# Patient Record
Sex: MALE | Race: WHITE | Employment: OTHER | URBAN - NONMETROPOLITAN AREA
[De-identification: names, ages, dates, MRNs, and addresses within clinical notes are randomized per-mention and may not be internally consistent; named-entity substitution may affect disease eponyms.]

---

## 2017-05-31 ENCOUNTER — OFFICE VISIT (OUTPATIENT)
Dept: PRIMARY CARE CLINIC | Age: 27
End: 2017-05-31
Payer: COMMERCIAL

## 2017-05-31 VITALS
DIASTOLIC BLOOD PRESSURE: 76 MMHG | TEMPERATURE: 98.3 F | HEIGHT: 69 IN | HEART RATE: 67 BPM | OXYGEN SATURATION: 98 % | SYSTOLIC BLOOD PRESSURE: 136 MMHG | WEIGHT: 204.8 LBS | BODY MASS INDEX: 30.33 KG/M2
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
## 2017-05-31 DIAGNOSIS — I88.9 LYMPHADENITIS: Primary | ICD-10-CM

## 2017-05-31 DIAGNOSIS — M25.50 POLYARTHRALGIA: ICD-10-CM

## 2017-05-31 DIAGNOSIS — M79.10 MYALGIA: ICD-10-CM

## 2017-05-31 PROCEDURE — 99213 OFFICE O/P EST LOW 20 MIN: CPT | Performed by: PEDIATRICS

## 2017-05-31 RX ORDER — DOXYCYCLINE HYCLATE 100 MG
100 TABLET ORAL 2 TIMES DAILY
Qty: 20 TABLET | Refills: 0 | Status: SHIPPED | OUTPATIENT
Start: 2017-05-31 | End: 2017-06-10

## 2017-05-31 ASSESSMENT — ENCOUNTER SYMPTOMS
EYE DISCHARGE: 0
SINUS PRESSURE: 0
BACK PAIN: 0
VOMITING: 0
ABDOMINAL PAIN: 0
DIARRHEA: 0
COUGH: 0
SORE THROAT: 0
SHORTNESS OF BREATH: 0
NAUSEA: 0
RHINORRHEA: 0
VOICE CHANGE: 0

## 2017-10-07 ENCOUNTER — OFFICE VISIT (OUTPATIENT)
Dept: URGENT CARE | Age: 27
End: 2017-10-07
Payer: COMMERCIAL

## 2017-10-07 VITALS
HEART RATE: 70 BPM | DIASTOLIC BLOOD PRESSURE: 75 MMHG | WEIGHT: 207 LBS | RESPIRATION RATE: 20 BRPM | TEMPERATURE: 98.5 F | HEIGHT: 69 IN | BODY MASS INDEX: 30.66 KG/M2 | OXYGEN SATURATION: 100 % | SYSTOLIC BLOOD PRESSURE: 130 MMHG

## 2017-10-07 DIAGNOSIS — L03.115 CELLULITIS OF RIGHT LOWER EXTREMITY: Primary | ICD-10-CM

## 2017-10-07 PROCEDURE — 99213 OFFICE O/P EST LOW 20 MIN: CPT | Performed by: FAMILY MEDICINE

## 2017-10-07 RX ORDER — DOXYCYCLINE 100 MG/1
100 CAPSULE ORAL 2 TIMES DAILY
Qty: 20 CAPSULE | Refills: 0 | Status: SHIPPED | OUTPATIENT
Start: 2017-10-07 | End: 2017-10-07 | Stop reason: SDUPTHER

## 2017-10-07 RX ORDER — DOXYCYCLINE 100 MG/1
100 CAPSULE ORAL 2 TIMES DAILY
Qty: 20 CAPSULE | Refills: 0 | Status: SHIPPED | OUTPATIENT
Start: 2017-10-07

## 2017-10-07 NOTE — PROGRESS NOTES
[address]  [address] [place] [id number]  Dept: [phone]  Loc: [phone]    Eze London is a 32 y.o. male who presents today for his medical conditions/complaints as noted below. Eze London is c/o of Leg Swelling (right leg swelling and pain started thursday)        HPI:       HPI  Gives testosterone injections in his thighs twice a week. He developed swelling and redness right anterior thigh after his last injection 2 days ago. Also with reported fever. No past medical history on file. No past surgical history on file. No family history on file. Social History   Substance Use Topics    Smoking status: Never Smoker    Smokeless tobacco: Never Used    Alcohol use No      Current Outpatient Prescriptions   Medication Sig Dispense Refill    doxycycline monohydrate (MONODOX) 100 MG capsule Take 1 capsule by mouth 2 times daily 20 capsule 0    Testosterone Cypionate 200 MG/ML KIT Inject into the muscle 0.6 twice a week       No current facility-administered medications for this visit. No Known Allergies      Subjective:      Review of Systems   Constitutional: Positive for chills and fever. Musculoskeletal: Positive for myalgias. Neurological: Negative for weakness and numbness. See HPI for visit specific review of symptoms. Objective:   /75 (Site: Left Arm, Position: Sitting, Cuff Size: Large Adult)   Pulse 70   Temp 98.5 °F (36.9 °C) (Temporal)   Resp 20   Ht 5' 9\" (1.753 m)   Wt 207 lb (93.9 kg)   SpO2 100%   BMI 30.57 kg/m²   Physical Exam   Constitutional: He appears well-developed. He does not appear ill. Eyes: Pupils are equal, round, and reactive to light. Neck: Normal range of motion. Neck supple. Cardiovascular: Normal rate and regular rhythm. Exam reveals no friction rub. No murmur heard.   Pulmonary/Chest: Effort normal and breath sounds normal. No respiratory distress. He has no wheezes. He has no rales. Abdominal: Soft. Bowel sounds are normal. He exhibits no distension. There is no tenderness. There is no rebound and no guarding. Musculoskeletal: He exhibits no edema. Swelling, redness, warmth, and tenderness right anterior thigh. No calf swelling or tenderness. Neg Kwasi's sign. Neurological: He is alert. Psychiatric: He has a normal mood and affect. His behavior is normal.           No visits with results within 1 Day(s) from this visit. Latest known visit with results is:   Office Visit on 12/01/2015   Component Date Value Ref Range Status    Influenza A Ab 12/01/2015 negative   Final    Influenza B Ab 12/01/2015 negative   Final    Strep A Ag 12/01/2015 None Detected  None Detected Final           Assessment & Plan: Simon Morgan was seen today for leg swelling. Diagnoses and all orders for this visit:    Cellulitis of right lower extremity  Most likely a a hematoma has formed and is causing his symptoms. Concerned about possible secondary infection. Place on doxycycline. Other orders  -     doxycycline monohydrate (MONODOX) 100 MG capsule; Take 1 capsule by mouth 2 times daily        Return if symptoms worsen or fail to improve. Patient Instructions   · Ice the area for 15 to 20 minutes three times a day. · Keep leg elevated above the heart today and tomorrow  · May wear a loose fitting compression bandage or ACE wrap. · Return to clinic if symptoms worsen. · Go to ER if develops numbness, tingling, or worsening swelling of lower extremity. Discussed use, benefit, and side effects of prescribed medications. All patient questions answered. Pt voiced understanding. Patient agreed with treatment plan. Follow up as directed.

## 2017-10-07 NOTE — PATIENT INSTRUCTIONS
· Ice the area for 15 to 20 minutes three times a day. · Keep leg elevated above the heart today and tomorrow  · May wear a loose fitting compression bandage or ACE wrap. · Return to clinic if symptoms worsen. · Go to ER if develops numbness, tingling, or worsening swelling of lower extremity.

## 2017-11-03 RX ORDER — SULFAMETHOXAZOLE AND TRIMETHOPRIM 800; 160 MG/1; MG/1
1 TABLET ORAL 2 TIMES DAILY
Qty: 20 TABLET | Refills: 0 | Status: SHIPPED | OUTPATIENT
Start: 2017-11-03 | End: 2017-11-13

## 2025-05-07 ENCOUNTER — LAB (OUTPATIENT)
Dept: LAB | Facility: HOSPITAL | Age: 35
End: 2025-05-07
Payer: COMMERCIAL

## 2025-05-07 ENCOUNTER — PATIENT ROUNDING (BHMG ONLY) (OUTPATIENT)
Dept: INTERNAL MEDICINE | Facility: CLINIC | Age: 35
End: 2025-05-07
Payer: COMMERCIAL

## 2025-05-07 ENCOUNTER — OFFICE VISIT (OUTPATIENT)
Dept: INTERNAL MEDICINE | Facility: CLINIC | Age: 35
End: 2025-05-07
Payer: COMMERCIAL

## 2025-05-07 VITALS
RESPIRATION RATE: 16 BRPM | TEMPERATURE: 98.2 F | HEART RATE: 85 BPM | SYSTOLIC BLOOD PRESSURE: 149 MMHG | WEIGHT: 224 LBS | BODY MASS INDEX: 33.18 KG/M2 | OXYGEN SATURATION: 98 % | HEIGHT: 69 IN | DIASTOLIC BLOOD PRESSURE: 108 MMHG

## 2025-05-07 DIAGNOSIS — E66.811 CLASS 1 OBESITY DUE TO EXCESS CALORIES WITH SERIOUS COMORBIDITY AND BODY MASS INDEX (BMI) OF 33.0 TO 33.9 IN ADULT: ICD-10-CM

## 2025-05-07 DIAGNOSIS — E66.09 CLASS 1 OBESITY DUE TO EXCESS CALORIES WITH SERIOUS COMORBIDITY AND BODY MASS INDEX (BMI) OF 33.0 TO 33.9 IN ADULT: ICD-10-CM

## 2025-05-07 DIAGNOSIS — I10 PRIMARY HYPERTENSION: Primary | ICD-10-CM

## 2025-05-07 DIAGNOSIS — E29.1 HYPOGONADISM IN MALE: ICD-10-CM

## 2025-05-07 DIAGNOSIS — Z00.01 ANNUAL VISIT FOR GENERAL ADULT MEDICAL EXAMINATION WITH ABNORMAL FINDINGS: ICD-10-CM

## 2025-05-07 DIAGNOSIS — R21 BUTTERFLY RASH: ICD-10-CM

## 2025-05-07 LAB
ALBUMIN SERPL-MCNC: 4.5 G/DL (ref 3.5–5.2)
ALBUMIN/GLOB SERPL: 1.5 G/DL
ALP SERPL-CCNC: 73 U/L (ref 39–117)
ALT SERPL W P-5'-P-CCNC: 43 U/L (ref 1–41)
ANION GAP SERPL CALCULATED.3IONS-SCNC: 12 MMOL/L (ref 5–15)
AST SERPL-CCNC: 30 U/L (ref 1–40)
BASOPHILS # BLD AUTO: 0.09 10*3/MM3 (ref 0–0.2)
BASOPHILS NFR BLD AUTO: 1.2 % (ref 0–1.5)
BILIRUB SERPL-MCNC: 0.7 MG/DL (ref 0–1.2)
BUN SERPL-MCNC: 9 MG/DL (ref 6–20)
BUN/CREAT SERPL: 8.7 (ref 7–25)
CALCIUM SPEC-SCNC: 9.5 MG/DL (ref 8.6–10.5)
CHLORIDE SERPL-SCNC: 103 MMOL/L (ref 98–107)
CHOLEST SERPL-MCNC: 293 MG/DL (ref 0–200)
CO2 SERPL-SCNC: 24 MMOL/L (ref 22–29)
CREAT SERPL-MCNC: 1.04 MG/DL (ref 0.76–1.27)
DEPRECATED RDW RBC AUTO: 42.1 FL (ref 37–54)
EGFRCR SERPLBLD CKD-EPI 2021: 95.4 ML/MIN/1.73
EOSINOPHIL # BLD AUTO: 0.69 10*3/MM3 (ref 0–0.4)
EOSINOPHIL NFR BLD AUTO: 9.1 % (ref 0.3–6.2)
ERYTHROCYTE [DISTWIDTH] IN BLOOD BY AUTOMATED COUNT: 12.6 % (ref 12.3–15.4)
GLOBULIN UR ELPH-MCNC: 3 GM/DL
GLUCOSE SERPL-MCNC: 102 MG/DL (ref 65–99)
HCT VFR BLD AUTO: 46.1 % (ref 37.5–51)
HCV AB SER QL: NORMAL
HDLC SERPL-MCNC: 56 MG/DL (ref 40–60)
HGB BLD-MCNC: 16.1 G/DL (ref 13–17.7)
IMM GRANULOCYTES # BLD AUTO: 0.02 10*3/MM3 (ref 0–0.05)
IMM GRANULOCYTES NFR BLD AUTO: 0.3 % (ref 0–0.5)
LDLC SERPL CALC-MCNC: 154 MG/DL (ref 0–100)
LDLC/HDLC SERPL: 2.66 {RATIO}
LYMPHOCYTES # BLD AUTO: 1.81 10*3/MM3 (ref 0.7–3.1)
LYMPHOCYTES NFR BLD AUTO: 23.9 % (ref 19.6–45.3)
MCH RBC QN AUTO: 32.1 PG (ref 26.6–33)
MCHC RBC AUTO-ENTMCNC: 34.9 G/DL (ref 31.5–35.7)
MCV RBC AUTO: 91.8 FL (ref 79–97)
MONOCYTES # BLD AUTO: 0.62 10*3/MM3 (ref 0.1–0.9)
MONOCYTES NFR BLD AUTO: 8.2 % (ref 5–12)
NEUTROPHILS NFR BLD AUTO: 4.35 10*3/MM3 (ref 1.7–7)
NEUTROPHILS NFR BLD AUTO: 57.3 % (ref 42.7–76)
NRBC BLD AUTO-RTO: 0 /100 WBC (ref 0–0.2)
PLATELET # BLD AUTO: 297 10*3/MM3 (ref 140–450)
PMV BLD AUTO: 10 FL (ref 6–12)
POTASSIUM SERPL-SCNC: 4 MMOL/L (ref 3.5–5.2)
PROT SERPL-MCNC: 7.5 G/DL (ref 6–8.5)
RBC # BLD AUTO: 5.02 10*6/MM3 (ref 4.14–5.8)
SODIUM SERPL-SCNC: 139 MMOL/L (ref 136–145)
TRIGL SERPL-MCNC: 439 MG/DL (ref 0–150)
VLDLC SERPL-MCNC: 83 MG/DL (ref 5–40)
WBC NRBC COR # BLD AUTO: 7.58 10*3/MM3 (ref 3.4–10.8)

## 2025-05-07 PROCEDURE — 80053 COMPREHEN METABOLIC PANEL: CPT | Performed by: INTERNAL MEDICINE

## 2025-05-07 PROCEDURE — 86803 HEPATITIS C AB TEST: CPT

## 2025-05-07 PROCEDURE — 85025 COMPLETE CBC W/AUTO DIFF WBC: CPT | Performed by: INTERNAL MEDICINE

## 2025-05-07 PROCEDURE — 80061 LIPID PANEL: CPT | Performed by: INTERNAL MEDICINE

## 2025-05-07 PROCEDURE — 36415 COLL VENOUS BLD VENIPUNCTURE: CPT

## 2025-05-07 PROCEDURE — 84403 ASSAY OF TOTAL TESTOSTERONE: CPT | Performed by: INTERNAL MEDICINE

## 2025-05-07 PROCEDURE — 84443 ASSAY THYROID STIM HORMONE: CPT | Performed by: INTERNAL MEDICINE

## 2025-05-07 RX ORDER — LISINOPRIL AND HYDROCHLOROTHIAZIDE 10; 12.5 MG/1; MG/1
1 TABLET ORAL DAILY
Qty: 30 TABLET | Refills: 0 | Status: SHIPPED | OUTPATIENT
Start: 2025-05-07

## 2025-05-07 NOTE — PROGRESS NOTES
May 7, 2025    Hello, may I speak with Boubacar Ware?    My name is DARIN PURI      I am  with [facility]  [address], [address]  [facility] [address] [phone].    Before we get started may I verify your date of birth? 1990    I am calling to officially welcome you to our practice and ask about your recent visit. Is this a good time to talk? yes    Tell me about your visit with us. What things went well?  IT WAS A GREAT VISIT.       We're always looking for ways to make our patients' experiences even better. Do you have recommendations on ways we may improve?  no    Overall were you satisfied with your first visit to our practice? yes       I appreciate you taking the time to speak with me today. Is there anything else I can do for you? no      Thank you, and have a great day.

## 2025-05-07 NOTE — PROGRESS NOTES
Subjective     Chief Complaint   Patient presents with    [facility]    Hypertension       Hypertension    History of Present Illness  The patient presents for evaluation of hypertension, facial rash, and low testosterone.    He has been experiencing symptoms of hypertension for the past year, characterized by persistent facial flushing and chest discomfort. Approximately 2 to 3 months ago, during a dental procedure, his blood pressure was recorded at 190/133, which was attributed to a potential infection and procedural anxiety. Since then, he has been monitoring his blood pressure using a wrist device, with readings consistently around 140 to 145/100. He reports no history of tobacco use but admits to occasional alcohol consumption. He is not currently on any medications and has no known allergies.     He reports no tinnitus or headaches but does experience occasional dizziness and cognitive fog. He experienced an episode of anxiety last night, characterized by tachycardia and insomnia, which he reports as the second such incident in the past year. He also reports no chest pain or shortness of breath. He reports no urinary issues or changes in bowel habits. He occasionally wakes up at night to urinate but does not consider it problematic. He reports no unusual joint pain and maintains that his work and household activities are not affected by his health. He reports a decrease in appetite and has not been feeling particularly hungry recently.    He first noticed a facial rash approximately a year ago, which seems to intensify after lunch but not dinner. He has attempted to manage the rash with over-the-counter products, including CeraVe, which helps with dryness but not redness.    He has a history of testosterone therapy, which he believes improved his energy levels. He was started on hCG when he was 21 years old, but it did not help him. He was then started on testosterone injections, which he  "continued for about 6 to 7 years.    SOCIAL HISTORY  He does not use tobacco. He is an occasional drinker. He is  and has a 9-year-old son.    FAMILY HISTORY  His mother has thyroid issues, diabetes, and fibromyalgia. His maternal grandfather had colon cancer, and his maternal grandmother had breast cancer.      Past Medical History:   Past Medical History:   Diagnosis Date    GERD (gastroesophageal reflux disease)     Hypertension      Past Surgical History:History reviewed. No pertinent surgical history.  Social History:  reports that he has never smoked. He does not have any smokeless tobacco history on file. He reports current alcohol use of about 10.0 standard drinks of alcohol per week. He reports that he does not use drugs.    Family History: family history includes Diabetes in his mother; Heart disease in his paternal grandfather; Thyroid disease in his mother.      Allergies:  No Known Allergies  Medications:  Prior to Admission medications    Medication Sig Start Date End Date Taking? Authorizing Provider   lisinopril-hydrochlorothiazide (Zestoretic) 10-12.5 MG per tablet Take 1 tablet by mouth Daily. 5/7/25   Fransisco Nair MD   metroNIDAZOLE (METROCREAM) 0.75 % cream Apply 1 Application topically to the appropriate area as directed 2 (Two) Times a Day. 5/7/25   Fransisco Nair MD           Review of systems   negative unless otherwise specified above in HPI    Objective     Vital Signs: BP (!) 158/112 (BP Location: Left arm, Patient Position: Sitting, Cuff Size: Other (Comment))   Pulse 85   Temp 98.2 °F (36.8 °C) (Temporal)   Resp 16   Ht 175.3 cm (69\")   Wt 102 kg (224 lb)   SpO2 98%   BMI 33.08 kg/m²     Physical Exam  Vitals reviewed.   Constitutional:       Appearance: Normal appearance. He is obese.   HENT:      Head: Normocephalic and atraumatic.      Right Ear: Tympanic membrane normal.      Left Ear: Tympanic membrane normal.      Nose: Nose normal.      Mouth/Throat:      " "Mouth: Mucous membranes are moist.      Pharynx: Oropharynx is clear.   Eyes:      Extraocular Movements: Extraocular movements intact.      Pupils: Pupils are equal, round, and reactive to light.   Cardiovascular:      Rate and Rhythm: Normal rate and regular rhythm.      Pulses: Normal pulses.   Pulmonary:      Effort: Pulmonary effort is normal.      Breath sounds: Normal breath sounds.   Abdominal:      General: Abdomen is flat. Bowel sounds are normal.      Palpations: Abdomen is soft.   Musculoskeletal:         General: Normal range of motion.      Cervical back: Normal range of motion and neck supple.   Skin:     General: Skin is warm and dry.      Capillary Refill: Capillary refill takes less than 2 seconds.      Findings: Erythema (butterfly rash on both cheeks) and rash present.   Neurological:      General: No focal deficit present.      Mental Status: He is alert and oriented to person, place, and time.   Psychiatric:         Mood and Affect: Mood normal.         Behavior: Behavior normal.         Thought Content: Thought content normal.         Judgment: Judgment normal.     Physical Exam  Mouth/Throat: Mucous membranes moist, no erythema, no exudate  Respiratory: Clear to auscultation, no wheezing, rales or rhonchi  Skin: Erythema noted on the face, consistent with possible rosacea    BMI is >= 30 and <35. (Class 1 Obesity). The following options were offered after discussion;: weight loss educational material (shared in after visit summary), exercise counseling/recommendations, and nutrition counseling/recommendations        Results Reviewed:  No results found for: \"GLUCOSE\", \"BUN\", \"CREATININE\", \"NA\", \"K\", \"CL\", \"CO2\", \"CALCIUM\", \"ALT\", \"AST\", \"WBC\", \"HCT\", \"PLT\", \"CHOL\", \"TRIG\", \"HDL\", \"LDL\", \"LDLHDL\", \"HGBA1C\"          Results          Assessment / Plan     Assessment/Plan:   Diagnosis Plan   1. Primary hypertension  Comprehensive Metabolic Panel    Testosterone    lisinopril-hydrochlorothiazide " (Zestoretic) 10-12.5 MG per tablet      2. Annual visit for general adult medical examination with abnormal findings  Thyroid Cascade Profile    Lipid Panel    CBC & Differential    Hepatitis C antibody      3. Butterfly rash  metroNIDAZOLE (METROCREAM) 0.75 % cream      4. Hypogonadism in male            Assessment & Plan  1. Hypertension.  - He has been experiencing elevated blood pressure readings, with a recent measurement of 190/133 mmHg during a dental visit. Home readings have been around 140-145/100 mmHg using a wrist monitor.  - No medications currently being taken for hypertension.  - A comprehensive set of laboratory tests will be conducted today, including cholesterol panel, chemistry panel, blood count, thyroid function, and testosterone level.  - A prescription for lisinopril-hydrochlorothiazide once daily has been issued to manage his hypertension. He is advised to take the medication at the same time each day, preferably with food. Follow-up in one week to assess the effectiveness of the medication and review lab results.    2. Facial rash.  - The facial rash appears consistent with rosacea but could also be indicative of a butterfly rash associated with a connective tissue disorder or autoimmune disease.  - No other rashes or symptoms suggestive of lupus or other connective tissue disorders.  - He is advised to use non-irritating facial products and sunscreen with an SPF of 30 or higher.  - A thin layer of metronidazole cream is to be applied twice daily. If the cream is too expensive, alternative treatments will be considered.    3. Low testosterone.  - He reports a history of low testosterone levels and previous treatment with testosterone injections.  - Lack of energy and low testosterone levels were previously managed with testosterone injections.  - A testosterone level test will be included in today's lab work.  - Further management will depend on the results and his blood pressure  control.    Follow-up  - The patient will follow up in 1 week.      Return in about 1 week (around 5/14/2025). unless patient needs to be seen sooner or acute issues arise.      I have discussed the patient results/orders and and plan/recommendation with them at today's visit.      Signed by:    Dr. Fransisco Nair Date: 05/07/25    EMR Dictation/Transcription disclaimer:   Some of this note may be an electronic transcription/translation of spoken language to printed text. The electronic translation of spoken language may permit erroneous, or at times, nonsensical words or phrases to be inadvertently transcribed; Although I have reviewed the note for such errors, some may still exist.      Patient or patient representative verbalized consent for the use of Ambient Listening during the visit with  Fransisco Nair MD for chart documentation. 5/7/2025  14:27 CDT

## 2025-05-08 DIAGNOSIS — E78.00 HYPERCHOLESTEROLEMIA: Primary | ICD-10-CM

## 2025-05-08 LAB
TESTOST SERPL-MCNC: 398 NG/DL (ref 249–836)
TSH SERPL DL<=0.005 MIU/L-ACNC: 1.61 UIU/ML (ref 0.45–4.5)

## 2025-05-13 ENCOUNTER — LAB (OUTPATIENT)
Dept: LAB | Facility: HOSPITAL | Age: 35
End: 2025-05-13
Payer: COMMERCIAL

## 2025-05-13 DIAGNOSIS — E78.00 HYPERCHOLESTEROLEMIA: ICD-10-CM

## 2025-05-13 LAB
CHOLEST SERPL-MCNC: 282 MG/DL (ref 0–200)
HDLC SERPL-MCNC: 47 MG/DL (ref 40–60)
LDLC SERPL CALC-MCNC: 109 MG/DL (ref 0–100)
LDLC/HDLC SERPL: 1.94 {RATIO}
TRIGL SERPL-MCNC: 719 MG/DL (ref 0–150)
VLDLC SERPL-MCNC: 126 MG/DL (ref 5–40)

## 2025-05-13 PROCEDURE — 80061 LIPID PANEL: CPT

## 2025-05-16 ENCOUNTER — LAB (OUTPATIENT)
Dept: LAB | Facility: HOSPITAL | Age: 35
End: 2025-05-16
Payer: COMMERCIAL

## 2025-05-16 ENCOUNTER — OFFICE VISIT (OUTPATIENT)
Dept: INTERNAL MEDICINE | Facility: CLINIC | Age: 35
End: 2025-05-16
Payer: COMMERCIAL

## 2025-05-16 VITALS
SYSTOLIC BLOOD PRESSURE: 123 MMHG | HEART RATE: 70 BPM | WEIGHT: 222 LBS | RESPIRATION RATE: 20 BRPM | DIASTOLIC BLOOD PRESSURE: 94 MMHG | HEIGHT: 69 IN | TEMPERATURE: 97.5 F | BODY MASS INDEX: 32.88 KG/M2 | OXYGEN SATURATION: 95 %

## 2025-05-16 DIAGNOSIS — L71.9 ROSACEA: ICD-10-CM

## 2025-05-16 DIAGNOSIS — I10 PRIMARY HYPERTENSION: ICD-10-CM

## 2025-05-16 DIAGNOSIS — D72.19 OTHER EOSINOPHILIA: ICD-10-CM

## 2025-05-16 DIAGNOSIS — E78.2 MIXED HYPERLIPIDEMIA: ICD-10-CM

## 2025-05-16 DIAGNOSIS — E78.2 MIXED HYPERLIPIDEMIA: Primary | ICD-10-CM

## 2025-05-16 PROBLEM — E66.09 CLASS 1 OBESITY DUE TO EXCESS CALORIES WITH SERIOUS COMORBIDITY AND BODY MASS INDEX (BMI) OF 32.0 TO 32.9 IN ADULT: Status: ACTIVE | Noted: 2025-05-16

## 2025-05-16 PROBLEM — E66.811 CLASS 1 OBESITY DUE TO EXCESS CALORIES WITH SERIOUS COMORBIDITY AND BODY MASS INDEX (BMI) OF 32.0 TO 32.9 IN ADULT: Status: ACTIVE | Noted: 2025-05-16

## 2025-05-16 PROBLEM — E29.1 HYPOGONADISM IN MALE: Status: RESOLVED | Noted: 2025-05-07 | Resolved: 2025-05-16

## 2025-05-16 LAB
CHOLEST SERPL-MCNC: 267 MG/DL (ref 0–200)
HDLC SERPL-MCNC: 51 MG/DL (ref 40–60)
LDLC SERPL CALC-MCNC: 151 MG/DL (ref 0–100)
LDLC/HDLC SERPL: 2.85 {RATIO}
TRIGL SERPL-MCNC: 352 MG/DL (ref 0–150)
VLDLC SERPL-MCNC: 65 MG/DL (ref 5–40)

## 2025-05-16 PROCEDURE — 80061 LIPID PANEL: CPT

## 2025-05-16 PROCEDURE — 99214 OFFICE O/P EST MOD 30 MIN: CPT | Performed by: INTERNAL MEDICINE

## 2025-05-16 PROCEDURE — 36415 COLL VENOUS BLD VENIPUNCTURE: CPT

## 2025-05-16 RX ORDER — LISINOPRIL AND HYDROCHLOROTHIAZIDE 10; 12.5 MG/1; MG/1
1 TABLET ORAL DAILY
Qty: 30 TABLET | Refills: 0 | Status: SHIPPED | OUTPATIENT
Start: 2025-05-16

## 2025-05-16 NOTE — PROGRESS NOTES
Subjective     Chief Complaint   Patient presents with    Primary hypertension     Patient stated no chest pain, or shortness of breath.       History of Present Illness  History of Present Illness  The patient presents for evaluation of a rash, hypertension, and hyperlipidemia.    He reports a slight improvement in his rash, describing it as feeling smoother. Despite previous consultations with various physicians, he has not received any medication for this condition until now.    He has been monitoring his blood pressure at home every other day, with a reading of 130/95 this morning prior to medication intake. He reports feeling well overall, describing a sensation of feeling loose and about to pop.    He expresses concern about elevated triglyceride levels, attributing them to a large meal consumed the previous night. He has been fasting since 6:00 PM the previous day and questions whether sugar-free water packs could influence his triglyceride levels. His total cholesterol is high, with an LDL of 109 and an HDL around 50. He believes a 10% weight loss would help manage his cholesterol levels.    The patient also mentions a history of eosinophils being high, which may be related to his facial inflammation. Additionally, he has a slightly elevated blood sugar and one slightly elevated liver enzyme, but his thyroid function is normal.    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      Past Medical History:   Past Medical History:   Diagnosis Date    GERD (gastroesophageal reflux disease)     Hypertension      Past Surgical History:History reviewed. No pertinent surgical history.  Social History:  reports that he has never smoked. He has never used smokeless tobacco. He reports current alcohol use of about 10.0 standard drinks of alcohol per week. He reports that he does not use drugs.    Family History: family history includes Diabetes in  "his mother; Heart disease in his paternal grandfather; Thyroid disease in his mother.      Allergies:  No Known Allergies  Medications:  Prior to Admission medications    Medication Sig Start Date End Date Taking? Authorizing Provider   lisinopril-hydrochlorothiazide (Zestoretic) 10-12.5 MG per tablet Take 1 tablet by mouth Daily. 5/16/25  Yes Fransisco Nair MD   metroNIDAZOLE (METROCREAM) 0.75 % cream Apply 1 Application topically to the appropriate area as directed 2 (Two) Times a Day. 5/7/25  Yes Fransisco Nair MD   lisinopril-hydrochlorothiazide (Zestoretic) 10-12.5 MG per tablet Take 1 tablet by mouth Daily. 5/7/25 5/16/25 Yes Fransisco Nair MD           Review of systems   negative unless otherwise specified above in HPI    Objective     Vital Signs: /94 (BP Location: Right arm, Patient Position: Sitting, Cuff Size: Adult)   Pulse 70   Temp 97.5 °F (36.4 °C) (Infrared)   Resp 20   Ht 175.3 cm (69.02\")   Wt 101 kg (222 lb)   SpO2 95%   BMI 32.77 kg/m²     Physical Exam  Vitals reviewed.   Constitutional:       Appearance: Normal appearance. He is obese.   HENT:      Head: Normocephalic and atraumatic.      Right Ear: Tympanic membrane normal.      Left Ear: Tympanic membrane normal.      Nose: Nose normal.      Mouth/Throat:      Mouth: Mucous membranes are moist.      Pharynx: Oropharynx is clear.   Eyes:      Extraocular Movements: Extraocular movements intact.      Pupils: Pupils are equal, round, and reactive to light.   Cardiovascular:      Rate and Rhythm: Normal rate and regular rhythm.      Pulses: Normal pulses.   Pulmonary:      Effort: Pulmonary effort is normal.      Breath sounds: Normal breath sounds.   Abdominal:      General: Abdomen is flat. Bowel sounds are normal.      Palpations: Abdomen is soft.   Musculoskeletal:         General: Normal range of motion.      Cervical back: Normal range of motion and neck supple.   Skin:     General: Skin is warm and dry.      " Capillary Refill: Capillary refill takes less than 2 seconds.      Findings: Erythema (butterfly rash on both cheeks) and rash present.      Comments: The rash is markedly improved on metrogel,    Neurological:      General: No focal deficit present.      Mental Status: He is alert and oriented to person, place, and time.   Psychiatric:         Mood and Affect: Mood normal.         Behavior: Behavior normal.         Thought Content: Thought content normal.         Judgment: Judgment normal.       Physical Exam  Skin: Improvement noted in rash, smoother texture observed.             Results Reviewed:  Glucose   Date Value Ref Range Status   05/07/2025 102 (H) 65 - 99 mg/dL Final     BUN   Date Value Ref Range Status   05/07/2025 9 6 - 20 mg/dL Final     Creatinine   Date Value Ref Range Status   05/07/2025 1.04 0.76 - 1.27 mg/dL Final     Sodium   Date Value Ref Range Status   05/07/2025 139 136 - 145 mmol/L Final     Potassium   Date Value Ref Range Status   05/07/2025 4.0 3.5 - 5.2 mmol/L Final     Comment:     Slight hemolysis detected by analyzer. Result may be falsely elevated.     Chloride   Date Value Ref Range Status   05/07/2025 103 98 - 107 mmol/L Final     CO2   Date Value Ref Range Status   05/07/2025 24.0 22.0 - 29.0 mmol/L Final     Calcium   Date Value Ref Range Status   05/07/2025 9.5 8.6 - 10.5 mg/dL Final     ALT (SGPT)   Date Value Ref Range Status   05/07/2025 43 (H) 1 - 41 U/L Final     AST (SGOT)   Date Value Ref Range Status   05/07/2025 30 1 - 40 U/L Final     WBC   Date Value Ref Range Status   05/07/2025 7.58 3.40 - 10.80 10*3/mm3 Final     Hematocrit   Date Value Ref Range Status   05/07/2025 46.1 37.5 - 51.0 % Final     Platelets   Date Value Ref Range Status   05/07/2025 297 140 - 450 10*3/mm3 Final     Total Cholesterol   Date Value Ref Range Status   05/13/2025 282 (H) 0 - 200 mg/dL Final     Triglycerides   Date Value Ref Range Status   05/13/2025 719 (H) 0 - 150 mg/dL Final     HDL  Cholesterol   Date Value Ref Range Status   05/13/2025 47 40 - 60 mg/dL Final     LDL Cholesterol    Date Value Ref Range Status   05/13/2025 109 (H) 0 - 100 mg/dL Final     LDL/HDL Ratio   Date Value Ref Range Status   05/13/2025 1.94  Final             Results  Labs   - Lipid Panel - Triglycerides: High   - Lipid Panel - Total Cholesterol: High   - Lipid Panel - LDL: 109   - Lipid Panel - HDL: 50   - Hepatitis C Antibody: Negative   - Testosterone: 400   - Complete Blood Count - Eosinophils: High   - Blood Glucose: Slightly Elevated   - Liver Enzyme: Slightly Elevated   - Thyroid Function Test: Normal        Assessment / Plan     Assessment/Plan:   Diagnosis Plan   1. Mixed hyperlipidemia  Lipid Panel      2. Primary hypertension  lisinopril-hydrochlorothiazide (Zestoretic) 10-12.5 MG per tablet      3. Rosacea        4. Other eosinophilia            Assessment & Plan  1. Rash.  - The rash appears to be improving, with the skin feeling smoother.  - Current treatment regimen is effective.  - Discussion included maintaining the current treatment for another month and considering dermatology referral if needed.  - No new medications or therapies prescribed at this time.    2. Hypertension.  - Blood pressure has shown significant improvement, with a recent reading of 130/95 mmHg before taking medication.  - Physical exam findings indicate better control of blood pressure.  - Discussion included monitoring blood pressure at home and feeling better overall.  - Prescription for an additional 30 tablets of lisinopril provided.    3. Mixed hyperlipidemia.  - Triglyceride levels are elevated, likely due to dietary indiscretions.  - Lab results show total cholesterol is high, but LDL and HDL levels are within acceptable ranges.  - Discussion included the potential familial influence on lipid levels and the benefits of a 10% weight loss.  - Lipid panel test ordered today.    4. Elevated eosinophils.  - Eosinophil count is  high, which may be contributing to the inflammation on the face.  - Lab results indicate elevated eosinophils.  - Discussion included the potential link between eosinophils and facial inflammation.  - No immediate action required, but it will be monitored.    5. Slightly elevated blood sugar.  - Blood sugar levels are slightly elevated.  - Lab results show slightly elevated blood sugar.  - Discussion included monitoring blood sugar and recommending lifestyle modifications such as diet and exercise.  - No new medications or therapies prescribed at this time.    6. Elevated liver enzyme.  - One liver enzyme is slightly elevated.  - Lab results indicate slightly elevated liver enzyme.  - Discussion included monitoring liver enzyme levels and further evaluation if necessary.  - No immediate action required, but it will be monitored.    Follow-up  - The patient will follow up in 1 month.      No follow-ups on file. unless patient needs to be seen sooner or acute issues arise.      I have discussed the patient results/orders and and plan/recommendation with them at today's visit.      Signed by:    Dr. Fransisco Nair Date: 05/16/25    EMR Dictation/Transcription disclaimer:   Some of this note may be an electronic transcription/translation of spoken language to printed text. The electronic translation of spoken language may permit erroneous, or at times, nonsensical words or phrases to be inadvertently transcribed; Although I have reviewed the note for such errors, some may still exist.      Patient or patient representative verbalized consent for the use of Ambient Listening during the visit with  Fransisco Nair MD for chart documentation. 5/16/2025  15:53 CDT

## 2025-06-18 ENCOUNTER — OFFICE VISIT (OUTPATIENT)
Dept: INTERNAL MEDICINE | Facility: CLINIC | Age: 35
End: 2025-06-18
Payer: COMMERCIAL

## 2025-06-18 VITALS
WEIGHT: 218.4 LBS | RESPIRATION RATE: 16 BRPM | BODY MASS INDEX: 32.35 KG/M2 | SYSTOLIC BLOOD PRESSURE: 119 MMHG | HEIGHT: 69 IN | HEART RATE: 79 BPM | DIASTOLIC BLOOD PRESSURE: 85 MMHG | OXYGEN SATURATION: 98 %

## 2025-06-18 DIAGNOSIS — Z80.0 FAMILY HISTORY OF COLON CANCER: ICD-10-CM

## 2025-06-18 DIAGNOSIS — L71.9 ROSACEA: Primary | ICD-10-CM

## 2025-06-18 DIAGNOSIS — E78.2 MIXED HYPERLIPIDEMIA: ICD-10-CM

## 2025-06-18 DIAGNOSIS — I10 PRIMARY HYPERTENSION: ICD-10-CM

## 2025-06-18 PROCEDURE — 99214 OFFICE O/P EST MOD 30 MIN: CPT | Performed by: INTERNAL MEDICINE

## 2025-06-18 RX ORDER — LISINOPRIL AND HYDROCHLOROTHIAZIDE 10; 12.5 MG/1; MG/1
1 TABLET ORAL DAILY
Qty: 90 TABLET | Refills: 3 | Status: SHIPPED | OUTPATIENT
Start: 2025-06-18

## 2025-06-18 NOTE — PROGRESS NOTES
Subjective     Chief Complaint   Patient presents with    Hypertension       Hypertension    History of Present Illness  The patient presents for evaluation of facial rash and blood pressure management.    He reports no significant improvement in his facial rash, which he attributes to his outdoor occupation as a , exposing him to constant heat. The severity of the rash appears to fluctuate, with some days being better than others. He is considering a referral to a dermatologist for further evaluation. His last consultation with a dermatologist was during his teenage years for acne treatment, which did not involve the use of Retin-A. He recalls using a medication in the early 2000s that caused significant dryness but did not provide lasting relief.    He has been diligent in applying the prescribed ointment every morning.     He has been adhering to a clean diet and has resumed weightlifting, although not at a heavy intensity. He reports an overall improvement in his well-being. He is not experiencing any chest pain or shortness of breath.    Family history includes maternal colon cancer and diabetes. He has not yet had a colonoscopy.    SOCIAL HISTORY  He works as a .    FAMILY HISTORY  He reports a family history of diabetes and maternal grandfather had colon cancer. He is not aware of any family history of heart disease.      Past Medical History:   Past Medical History:   Diagnosis Date    GERD (gastroesophageal reflux disease)     Hypertension      Past Surgical History:History reviewed. No pertinent surgical history.  Social History:  reports that he has never smoked. He has never been exposed to tobacco smoke. He has never used smokeless tobacco. He reports current alcohol use of about 10.0 standard drinks of alcohol per week. He reports that he does not use drugs.    Family History: family history includes Diabetes in his mother; Heart disease in his paternal grandfather; Thyroid  "disease in his mother.      Allergies:  No Known Allergies  Medications:  Prior to Admission medications    Medication Sig Start Date End Date Taking? Authorizing Provider   lisinopril-hydrochlorothiazide (Zestoretic) 10-12.5 MG per tablet Take 1 tablet by mouth Daily. 6/18/25  Yes Fransisco Nair MD   metroNIDAZOLE (METROCREAM) 0.75 % cream Apply 1 Application topically to the appropriate area as directed 2 (Two) Times a Day. 5/7/25  Yes Fransisco Nair MD   lisinopril-hydrochlorothiazide (Zestoretic) 10-12.5 MG per tablet Take 1 tablet by mouth Daily. 5/16/25 6/18/25 Yes Fransisco Nair MD           Review of systems   negative unless otherwise specified above in HPI    Objective     Vital Signs: /85 (BP Location: Left arm, Patient Position: Sitting, Cuff Size: Adult)   Pulse 79   Resp 16   Ht 175.3 cm (69.02\")   Wt 99.1 kg (218 lb 6.4 oz)   SpO2 98%   BMI 32.23 kg/m²     Physical Exam  Vitals and nursing note reviewed.   Constitutional:       Appearance: Normal appearance.   HENT:      Head: Normocephalic and atraumatic.        Comments: Brightly erythematous butterfly facial rash persists     Nose: No congestion or rhinorrhea.      Mouth/Throat:      Pharynx: Oropharynx is clear.   Eyes:      Extraocular Movements: Extraocular movements intact.      Pupils: Pupils are equal, round, and reactive to light.   Cardiovascular:      Rate and Rhythm: Normal rate and regular rhythm.   Pulmonary:      Effort: Pulmonary effort is normal.      Breath sounds: Normal breath sounds.   Abdominal:      General: Bowel sounds are normal.      Palpations: Abdomen is soft.   Musculoskeletal:         General: Normal range of motion.      Cervical back: Neck supple.   Lymphadenopathy:      Cervical: Cervical adenopathy present.   Skin:     General: Skin is warm and dry.      Capillary Refill: Capillary refill takes less than 2 seconds.   Neurological:      General: No focal deficit present.      Mental Status: " He is alert.   Psychiatric:         Mood and Affect: Mood normal.       Physical Exam               Results Reviewe  Results  Labs   - LDL: 109   - LDL: 150        Assessment / Plan     Assessment/Plan:   Diagnosis Plan   1. Rosacea  Ambulatory Referral to Dermatology      2. Primary hypertension  lisinopril-hydrochlorothiazide (Zestoretic) 10-12.5 MG per tablet      3. Family history of colon cancer  Ambulatory Referral to Gastroenterology      4. Mixed hyperlipidemia  Comprehensive Metabolic Panel    Lipid Panel        Referral to dermatology for evaluation and treatment of rosacea  Assessment & Plan  1. Facial rash.  - The etiology of the rash could potentially be linked to connective tissue disorders.  - Referral to Dr. Citlaly Judge, a dermatologist, will be initiated for further evaluation and management.  - Advised to persist with the application of MetroGel in the interim.  - No joint pains or connective tissue disorder symptoms reported.    2. Blood pressure management.  - Blood pressure readings are within the target range today at 119/85 mmHg.  - Weight has decreased by almost 5 pounds, indicating positive lifestyle changes.  - Will continue with the current antihypertensive medication regimen.  - A prescription for a 90-day supply will be sent to the pharmacy.    3. Elevated LDL levels.  - LDL levels have shown an increase, with one reading at 109 mg/dL and another at 150 mg/dL.  - Encouraged to maintain a healthy diet and regular exercise regimen over the next 4 months.  - A fasting lipid panel and chemistry panel will be ordered. Before his next ov  - The decision regarding the initiation of statin therapy will be made based on the results of these tests.    4. Colon cancer screening.  - Family history of colon cancer on the maternal side.  - Discussion about the necessity of a colonoscopy given the family history.  - Will consider scheduling a colonoscopy based on further discussions and  recommendations.      Return in about 4 months (around 10/18/2025). unless patient needs to be seen sooner or acute issues arise.      I have discussed the patient results/orders and and plan/recommendation with them at today's visit.      Signed by:    Dr. Fransisco Nair Date: 06/18/25    EMR Dictation/Transcription disclaimer:   Some of this note may be an electronic transcription/translation of spoken language to printed text. The electronic translation of spoken language may permit erroneous, or at times, nonsensical words or phrases to be inadvertently transcribed; Although I have reviewed the note for such errors, some may still exist.      Patient or patient representative verbalized consent for the use of Ambient Listening during the visit with  Fransisco Nair MD for chart documentation. 6/18/2025  07:46 CDT

## 2025-07-04 DIAGNOSIS — I10 PRIMARY HYPERTENSION: ICD-10-CM

## 2025-07-07 RX ORDER — LISINOPRIL AND HYDROCHLOROTHIAZIDE 10; 12.5 MG/1; MG/1
1 TABLET ORAL DAILY
Qty: 30 TABLET | OUTPATIENT
Start: 2025-07-07

## 2025-07-07 NOTE — TELEPHONE ENCOUNTER
Rx Refill Note  Requested Prescriptions     Pending Prescriptions Disp Refills    lisinopril-hydrochlorothiazide (PRINZIDE,ZESTORETIC) 10-12.5 MG per tablet [Pharmacy Med Name: LISINOPRIL-HCTZ 10/12.5MG TABLETS] 30 tablet      Sig: TAKE 1 TABLET BY MOUTH DAILY      Last office visit with prescribing clinician: 6/18/2025   Last telemedicine visit with prescribing clinician: Visit date not found   Next office visit with prescribing clinician: 10/17/2025                         Would you like a call back once the refill request has been completed: [] Yes [] No    If the office needs to give you a call back, can they leave a voicemail: [] Yes [] No    Gli Rapp MA  07/07/25, 09:54 CDTRx Refill Note  Requested Prescriptions     Pending Prescriptions Disp Refills    lisinopril-hydrochlorothiazide (PRINZIDE,ZESTORETIC) 10-12.5 MG per tablet [Pharmacy Med Name: LISINOPRIL-HCTZ 10/12.5MG TABLETS] 30 tablet      Sig: TAKE 1 TABLET BY MOUTH DAILY      Last office visit with prescribing clinician: 6/18/2025   Last telemedicine visit with prescribing clinician: Visit date not found   Next office visit with prescribing clinician: 10/17/2025                         Would you like a call back once the refill request has been completed: [] Yes [] No    If the office needs to give you a call back, can they leave a voicemail: [] Yes [] No    Gil Rapp MA  07/07/25, 09:54 CDT